# Patient Record
Sex: FEMALE | Race: WHITE | NOT HISPANIC OR LATINO | Employment: FULL TIME | URBAN - METROPOLITAN AREA
[De-identification: names, ages, dates, MRNs, and addresses within clinical notes are randomized per-mention and may not be internally consistent; named-entity substitution may affect disease eponyms.]

---

## 2024-11-15 ENCOUNTER — OFFICE VISIT (OUTPATIENT)
Dept: URGENT CARE | Facility: CLINIC | Age: 49
End: 2024-11-15
Payer: COMMERCIAL

## 2024-11-15 VITALS
RESPIRATION RATE: 16 BRPM | HEIGHT: 61 IN | WEIGHT: 158 LBS | HEART RATE: 101 BPM | DIASTOLIC BLOOD PRESSURE: 86 MMHG | TEMPERATURE: 98 F | SYSTOLIC BLOOD PRESSURE: 121 MMHG | BODY MASS INDEX: 29.83 KG/M2 | OXYGEN SATURATION: 98 %

## 2024-11-15 DIAGNOSIS — H66.91 ACUTE OTITIS MEDIA, RIGHT: ICD-10-CM

## 2024-11-15 DIAGNOSIS — R05.9 COUGH, UNSPECIFIED TYPE: ICD-10-CM

## 2024-11-15 DIAGNOSIS — J01.41 ACUTE RECURRENT PANSINUSITIS: ICD-10-CM

## 2024-11-15 DIAGNOSIS — J02.9 SORE THROAT: Primary | ICD-10-CM

## 2024-11-15 LAB
CTP QC/QA: YES
FLUAV AG NPH QL: NEGATIVE
FLUBV AG NPH QL: NEGATIVE
S PYO RRNA THROAT QL PROBE: NEGATIVE
SARS-COV-2 AG RESP QL IA.RAPID: NEGATIVE

## 2024-11-15 RX ORDER — LEVOCETIRIZINE DIHYDROCHLORIDE 5 MG/1
5 TABLET, FILM COATED ORAL NIGHTLY
Qty: 30 TABLET | Refills: 0 | Status: SHIPPED | OUTPATIENT
Start: 2024-11-15

## 2024-11-15 RX ORDER — PREDNISONE 20 MG/1
40 TABLET ORAL DAILY
Qty: 10 TABLET | Refills: 0 | Status: SHIPPED | OUTPATIENT
Start: 2024-11-15 | End: 2024-11-20

## 2024-11-15 RX ORDER — ALBUTEROL SULFATE 90 UG/1
1-2 INHALANT RESPIRATORY (INHALATION) EVERY 6 HOURS PRN
Qty: 18 G | Refills: 0 | Status: SHIPPED | OUTPATIENT
Start: 2024-11-15

## 2024-11-15 RX ORDER — AZELASTINE HYDROCHLORIDE, FLUTICASONE PROPIONATE 137; 50 UG/1; UG/1
1 SPRAY, METERED NASAL 2 TIMES DAILY
Qty: 23 G | Refills: 0 | Status: SHIPPED | OUTPATIENT
Start: 2024-11-15

## 2024-11-15 RX ORDER — AMOXICILLIN AND CLAVULANATE POTASSIUM 875; 125 MG/1; MG/1
1 TABLET, FILM COATED ORAL EVERY 12 HOURS
Qty: 20 TABLET | Refills: 0 | Status: SHIPPED | OUTPATIENT
Start: 2024-11-15 | End: 2024-11-25

## 2024-11-15 RX ORDER — PROMETHAZINE HYDROCHLORIDE AND DEXTROMETHORPHAN HYDROBROMIDE 6.25; 15 MG/5ML; MG/5ML
5 SYRUP ORAL
Qty: 118 ML | Refills: 0 | Status: SHIPPED | OUTPATIENT
Start: 2024-11-15 | End: 2024-11-25

## 2024-11-15 NOTE — PROGRESS NOTES
"Subjective:      Patient ID: Yolanda Anthony is a 49 y.o. female.    Vitals:  height is 5' 1" (1.549 m) and weight is 71.7 kg (158 lb). Her temperature is 98.1 °F (36.7 °C). Her blood pressure is 121/86 and her pulse is 101. Her respiration is 16 and oxygen saturation is 98%.     Chief Complaint: Cough, Nasal Congestion, and Otalgia    Patient is a 49-year-old female with a past medical history of GERD who presents to clinic for evaluation of sinus and URI related symptoms.  Patient reports symptoms began yesterday.  Patient reports she was COVID vaccinated however not boosted.  Patient reports not vaccinated for influenza.  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  Patient reports that she has not taken any over-the-counter medications for symptoms at this point.    Cough  This is a new problem. The current episode started yesterday. Associated symptoms include chills, ear pain (Right ear), headaches, myalgias, nasal congestion, postnasal drip and a sore throat. Pertinent negatives include no chest pain, fever, rash, shortness of breath or wheezing.       Constitution: Positive for chills and fatigue. Negative for sweating and fever.   HENT:  Positive for ear pain (Right ear), congestion, postnasal drip, sinus pressure, sore throat and voice change. Negative for ear discharge, tinnitus and hearing loss.    Neck: neck negative.   Cardiovascular: Negative.  Negative for chest pain and palpitations.   Eyes: Negative.    Respiratory:  Positive for cough and sputum production. Negative for chest tightness, shortness of breath and wheezing.    Gastrointestinal:  Positive for vomiting (Episode last night). Negative for abdominal pain, nausea and diarrhea.   Endocrine: negative.   Genitourinary: Negative.  Negative for dysuria, frequency and urgency.   Musculoskeletal:  Positive for back pain and muscle ache.   Skin: Negative.  Negative for color change, pale, rash and erythema. "   Allergic/Immunologic: Negative.    Neurological:  Positive for headaches. Negative for dizziness, light-headedness, passing out, disorientation and altered mental status.   Hematologic/Lymphatic: Negative.    Psychiatric/Behavioral: Negative.  Negative for altered mental status, disorientation and confusion.       Objective:     Physical Exam   Constitutional: She is oriented to person, place, and time. She appears well-developed. She is cooperative.  Non-toxic appearance. She appears ill. No distress.      Comments:Speaks in a hoarse voice     HENT:   Head: Normocephalic and atraumatic.   Ears:   Right Ear: Hearing, external ear and ear canal normal. No no drainage, swelling or tenderness. Tympanic membrane is erythematous and bulging. Tympanic membrane is not perforated. No decreased hearing is noted. no impacted cerumen  Left Ear: Hearing, tympanic membrane, external ear and ear canal normal.   Nose: Congestion present. No mucosal edema, rhinorrhea or nasal deformity. No epistaxis. Right sinus exhibits maxillary sinus tenderness and frontal sinus tenderness. Left sinus exhibits maxillary sinus tenderness and frontal sinus tenderness.   Mouth/Throat: Uvula is midline and mucous membranes are normal. Mucous membranes are moist. No trismus in the jaw. Normal dentition. No uvula swelling. Posterior oropharyngeal erythema present. No oropharyngeal exudate. Oropharynx is clear.   Eyes: Conjunctivae and lids are normal. Pupils are equal, round, and reactive to light. Right eye exhibits no discharge. Left eye exhibits no discharge. No scleral icterus.   Neck: Trachea normal and phonation normal. Neck supple.   Cardiovascular: Normal rate, regular rhythm, normal heart sounds and normal pulses.   Pulmonary/Chest: Effort normal and breath sounds normal. No respiratory distress (Unlabored.  Equal rise and fall of chest.  Able speak in full complete sentences.  No adventitious breath sounds noted.). She has no wheezes. She  has no rhonchi. She has no rales.   Abdominal: Normal appearance and bowel sounds are normal. She exhibits no distension. Soft. There is no abdominal tenderness.   Musculoskeletal: Normal range of motion.         General: Normal range of motion.   Neurological: She is alert and oriented to person, place, and time. She exhibits normal muscle tone.   Skin: Skin is warm, dry, intact, not diaphoretic, not pale and no rash. Capillary refill takes less than 2 seconds. No erythema   Psychiatric: Her speech is normal and behavior is normal. Judgment and thought content normal.   Nursing note and vitals reviewed.      Assessment:     1. Sore throat    2. Cough, unspecified type    3. Acute otitis media, right    4. Acute recurrent pansinusitis        Plan:       Sore throat  -     SARS Coronavirus 2 Antigen, POCT Manual Read  -     POCT Influenza A/B Rapid Antigen  -     POCT rapid strep A    Cough, unspecified type  -     SARS Coronavirus 2 Antigen, POCT Manual Read  -     POCT Influenza A/B Rapid Antigen  -     XR CHEST PA AND LATERAL; Future; Expected date: 11/15/2024    Acute otitis media, right    Acute recurrent pansinusitis    Other orders  -     amoxicillin-clavulanate 875-125mg (AUGMENTIN) 875-125 mg per tablet; Take 1 tablet by mouth every 12 (twelve) hours. for 10 days  Dispense: 20 tablet; Refill: 0  -     predniSONE (DELTASONE) 20 MG tablet; Take 2 tablets (40 mg total) by mouth once daily. for 5 days  Dispense: 10 tablet; Refill: 0  -     promethazine-dextromethorphan (PROMETHAZINE-DM) 6.25-15 mg/5 mL Syrp; Take 5 mLs by mouth every 4 to 6 hours as needed (Cough).  Dispense: 118 mL; Refill: 0  -     albuterol (VENTOLIN HFA) 90 mcg/actuation inhaler; Inhale 1-2 puffs into the lungs every 6 (six) hours as needed for Shortness of Breath or Wheezing. Rescue  Dispense: 18 g; Refill: 0  -     azelastine-fluticasone (DYMISTA) 137-50 mcg/spray Spry nassal spray; 1 spray by Each Nostril route 2 (two) times daily.   Dispense: 23 g; Refill: 0  -     levocetirizine (XYZAL) 5 MG tablet; Take 1 tablet (5 mg total) by mouth every evening.  Dispense: 30 tablet; Refill: 0                Labs: Influenza a and B negative.  COVID negative.  Rapid strep negative.    Chest x-ray completed in clinic.    Take medications as prescribed.    Discussed over-the-counter Mucinex with patient.    Tylenol/Motrin per package instructions for any pain or fever.    Assure adequate hydration.    Nasal saline flushes or irrigation as directed.    Follow-up with PCP in 1-2 days.    Return to clinic as needed.    To ED for any new or acutely worsening symptoms.    Patient in agreement with plan of care.    DISCLAIMER: Please note that my documentation in this Electronic Healthcare Record was produced using speech recognition software and therefore may contain errors related to that software system.These could include grammar, punctuation and spelling errors or the inclusion/exclusion of phrases that were not intended. Garbled syntax, mangled pronouns, and other bizarre constructions may be attributed to that software system.

## 2025-04-09 ENCOUNTER — OFFICE VISIT (OUTPATIENT)
Dept: URGENT CARE | Facility: CLINIC | Age: 50
End: 2025-04-09
Payer: COMMERCIAL

## 2025-04-09 ENCOUNTER — RESULTS FOLLOW-UP (OUTPATIENT)
Dept: URGENT CARE | Facility: CLINIC | Age: 50
End: 2025-04-09

## 2025-04-09 ENCOUNTER — HOSPITAL ENCOUNTER (OUTPATIENT)
Dept: RADIOLOGY | Facility: HOSPITAL | Age: 50
Discharge: HOME | End: 2025-04-09
Attending: STUDENT IN AN ORGANIZED HEALTH CARE EDUCATION/TRAINING PROGRAM
Payer: COMMERCIAL

## 2025-04-09 ENCOUNTER — TELEPHONE (OUTPATIENT)
Dept: URGENT CARE | Facility: CLINIC | Age: 50
End: 2025-04-09

## 2025-04-09 VITALS
HEIGHT: 61 IN | DIASTOLIC BLOOD PRESSURE: 89 MMHG | SYSTOLIC BLOOD PRESSURE: 127 MMHG | RESPIRATION RATE: 20 BRPM | HEART RATE: 78 BPM | BODY MASS INDEX: 29.83 KG/M2 | WEIGHT: 158 LBS | OXYGEN SATURATION: 99 % | TEMPERATURE: 98 F

## 2025-04-09 DIAGNOSIS — S09.90XA HEAD TRAUMA, INITIAL ENCOUNTER: ICD-10-CM

## 2025-04-09 DIAGNOSIS — M54.2 NECK PAIN: ICD-10-CM

## 2025-04-09 DIAGNOSIS — R42 DIZZINESS: ICD-10-CM

## 2025-04-09 DIAGNOSIS — S14.109A INJURY OF CERVICAL SPINE, INITIAL ENCOUNTER: ICD-10-CM
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
## 2025-04-09 DIAGNOSIS — S16.1XXA STRAIN OF NECK MUSCLE, INITIAL ENCOUNTER: ICD-10-CM

## 2025-04-09 DIAGNOSIS — S06.0XAA CONCUSSION WITH UNKNOWN LOSS OF CONSCIOUSNESS STATUS, INITIAL ENCOUNTER: ICD-10-CM

## 2025-04-09 PROCEDURE — 70450 CT HEAD/BRAIN W/O DYE: CPT | Mod: 26,,, | Performed by: RADIOLOGY

## 2025-04-09 PROCEDURE — 72125 CT NECK SPINE W/O DYE: CPT | Mod: TC

## 2025-04-09 PROCEDURE — 72125 CT NECK SPINE W/O DYE: CPT | Mod: 26,,, | Performed by: RADIOLOGY

## 2025-04-09 PROCEDURE — 70450 CT HEAD/BRAIN W/O DYE: CPT | Mod: TC

## 2025-04-09 RX ORDER — NAPROXEN 500 MG/1
500 TABLET ORAL 2 TIMES DAILY WITH MEALS
Qty: 20 TABLET | Refills: 0 | Status: SHIPPED | OUTPATIENT
Start: 2025-04-09 | End: 2025-04-19

## 2025-04-09 RX ORDER — METHOCARBAMOL 750 MG/1
750 TABLET, FILM COATED ORAL 3 TIMES DAILY PRN
Qty: 15 TABLET | Refills: 0 | Status: SHIPPED | OUTPATIENT
Start: 2025-04-09 | End: 2025-04-19

## 2025-04-09 NOTE — LETTER
April 9, 2025      [facility] [place]  [address]  [address]  [place] [address]  Phone: [phone]  Fax: [phone]       Patient: Yolanda Anthony   YOB: 1975  Date of Visit: 04/09/2025    To Whom It May Concern:    Ivan Anthony  was at [facility] on 04/09/2025. The patient may return to work/school on 04/10/2025 with no restrictions. If you have any questions or concerns, or if I can be of further assistance, please do not hesitate to contact me.    Sincerely,    Beny Vivas NP
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
## 2025-04-09 NOTE — TELEPHONE ENCOUNTER
Patient contacted and advised of negative CT head and CT C-spine results.  Patient has no further questions or concerns at this point.  Patient advised to follow-up closely with PCP or return to clinic/emergency department for any continued symptoms.  Patient in agreement with plan of care.

## 2025-04-10 ENCOUNTER — OFFICE VISIT (OUTPATIENT)
Dept: PODIATRY | Facility: CLINIC | Age: 50
End: 2025-04-10
Payer: COMMERCIAL

## 2025-04-10 ENCOUNTER — HOSPITAL ENCOUNTER (OUTPATIENT)
Dept: RADIOLOGY | Facility: HOSPITAL | Age: 50
Discharge: HOME | End: 2025-04-10
Attending: PODIATRIST
Payer: COMMERCIAL

## 2025-04-10 VITALS
DIASTOLIC BLOOD PRESSURE: 76 MMHG | WEIGHT: 158.06 LBS | SYSTOLIC BLOOD PRESSURE: 110 MMHG | BODY MASS INDEX: 29.84 KG/M2 | HEIGHT: 61 IN | HEART RATE: 74 BPM

## 2025-04-10 DIAGNOSIS — M20.11 HALLUX VALGUS OF RIGHT FOOT: ICD-10-CM

## 2025-04-10 DIAGNOSIS — D36.10 NEUROMA: Primary | ICD-10-CM

## 2025-04-10 PROCEDURE — 73630 X-RAY EXAM OF FOOT: CPT | Mod: 26,RT,, | Performed by: RADIOLOGY

## 2025-04-10 PROCEDURE — 1160F RVW MEDS BY RX/DR IN RCRD: CPT | Mod: S$GLB,,, | Performed by: PODIATRIST

## 2025-04-10 PROCEDURE — 3078F DIAST BP <80 MM HG: CPT | Mod: S$GLB,,, | Performed by: PODIATRIST

## 2025-04-10 PROCEDURE — 3074F SYST BP LT 130 MM HG: CPT | Mod: S$GLB,,, | Performed by: PODIATRIST

## 2025-04-10 PROCEDURE — 3008F BODY MASS INDEX DOCD: CPT | Mod: S$GLB,,, | Performed by: PODIATRIST

## 2025-04-10 PROCEDURE — 99203 OFFICE O/P NEW LOW 30 MIN: CPT | Mod: S$GLB,,, | Performed by: PODIATRIST

## 2025-04-10 PROCEDURE — 99999 PR PBB SHADOW E&M-EST. PATIENT-LVL V: CPT | Mod: PBBFAC,,, | Performed by: PODIATRIST

## 2025-04-10 PROCEDURE — 73630 X-RAY EXAM OF FOOT: CPT | Mod: TC,PN,RT

## 2025-04-10 PROCEDURE — 1159F MED LIST DOCD IN RCRD: CPT | Mod: S$GLB,,, | Performed by: PODIATRIST

## 2025-04-10 RX ORDER — BUSPIRONE HYDROCHLORIDE 15 MG/1
15 TABLET ORAL 3 TIMES DAILY
COMMUNITY
Start: 2024-11-17

## 2025-04-10 RX ORDER — CETIRIZINE HYDROCHLORIDE 10 MG/1
10 TABLET ORAL
COMMUNITY
Start: 2025-02-27

## 2025-04-10 RX ORDER — VENLAFAXINE HYDROCHLORIDE 150 MG/1
1 TABLET, EXTENDED RELEASE ORAL EVERY MORNING
COMMUNITY
Start: 2025-03-16

## 2025-04-13 NOTE — PROGRESS NOTES
"Subjective:       Patient ID: Yolanda Anthony is a 50 y.o. female.    Chief Complaint: Toe Pain  Patient presents with a complaint of numbness in the toes on her right foot she also has a bunion which runs in her family.  Patient states the left foot is not really bothering her.  Patient relates a zinging type pain that she has been experiencing ever since she had knee surgery on the right side.  Patient states she was not sure if nerves were damaged at the time of the knee surgery.  Patient works as a  so she is on her feet quite a bit.    History reviewed. No pertinent past medical history.  Past Surgical History:   Procedure Laterality Date    KNEE SURGERY Bilateral      No family history on file.  Social History     Socioeconomic History    Marital status:    Tobacco Use    Smoking status: Never    Smokeless tobacco: Never   Substance and Sexual Activity    Alcohol use: Not Currently    Drug use: Never       Current Medications[1]  Review of patient's allergies indicates:   Allergen Reactions    Insect venom Anaphylaxis    Wasp venom Anaphylaxis    Venom-honey bee Nausea And Vomiting       Review of Systems   Musculoskeletal:  Positive for arthralgias and joint swelling.   Neurological:  Positive for numbness.   All other systems reviewed and are negative.      Objective:      Vitals:    04/10/25 0804   BP: 110/76   Pulse: 74   Weight: 71.7 kg (158 lb 1.1 oz)   Height: 5' 1" (1.549 m)     Physical Exam  Vitals and nursing note reviewed.   Constitutional:       Appearance: Normal appearance.   Cardiovascular:      Pulses:           Dorsalis pedis pulses are 2+ on the right side and 2+ on the left side.        Posterior tibial pulses are 1+ on the right side and 1+ on the left side.   Pulmonary:      Effort: Pulmonary effort is normal.   Musculoskeletal:         General: Swelling and tenderness present.      Right foot: Bunion and prominent metatarsal heads present.      Left foot: " Prominent metatarsal heads present.        Feet:    Feet:      Right foot:      Protective Sensation: 3 sites tested.  3 sites sensed.      Skin integrity: Erythema and warmth present.      Left foot:      Protective Sensation: 3 sites tested.  3 sites sensed.   Skin:     Capillary Refill: Capillary refill takes 2 to 3 seconds.      Findings: Erythema present.   Neurological:      General: No focal deficit present.      Mental Status: She is alert.   Psychiatric:         Mood and Affect: Mood normal.         Behavior: Behavior normal.                        Assessment:       1. Neuroma    2. Hallux valgus of right foot        Plan:       Patient presents with a complaint of numbness in the toes on her right foot she also has a bunion which runs in her family.  Patient states the left foot is not really bothering her.  Patient relates a zinging type pain that she has been experiencing ever since she had knee surgery on the right side.  Patient states she was not sure if nerves were damaged at the time of the knee surgery.  Patient works as a  so she is on her feet quite a bit.  A comprehensive new patient evaluation was performed today patient does have some mild bunion discomfort I have ordered plain film x-rays I want to evaluate the patient's right foot she does have some well-defined inflammation and swelling on the plantar forefoot right in comparison to the left I advised the patient she has symptoms consistent with neuroma this would be causing the numbness in the toes and the fact that all of her toes are not numb indicates this did not happen at the time of the knee surgery did not have anything to do with the knee surgery or a nerve being injured this is related to the bones being compressed in between the metatarsal heads likely because of the bunion deformity and the transfer of weight to the other metatarsals because of the hypermobility of the 1st metatarsal.  Patient does have significant  hypermobility at the level of the base of the 1st metatarsal medial cuneiform joint this is appreciated bilateral right greater than left.  I will review the plain film x-rays with the patient at her follow-up in the meantime I have dispensed the patient some small blue arch pads and I put these in her shoe she is going to put him in her other shoes I gave her extra as it is important that she has appropriate support this can be very beneficial at a dressing neuroma discomfort we may have to consider a metatarsal pad she definitely has got to wear shoes with a wide toe box.  Patient's numbness is primarily the 2nd 3rd and 4th digits she definitely has pain upon palpation and compression of the plantar forefoot right.  Follow-up 2 weeks we will re-evaluate the patient see how she is doing we can also review the x-rays at that time.This note was created using [employer] voice recognition software that occasionally misinterpreted phrases or words.        [1]   Current Outpatient Medications   Medication Sig Dispense Refill    albuterol (VENTOLIN HFA) 90 mcg/actuation inhaler Inhale 1-2 puffs into the lungs every 6 (six) hours as needed for Shortness of Breath or Wheezing. Rescue 18 g 0    azelastine-fluticasone (DYMISTA) 137-50 mcg/spray Spry nassal spray 1 spray by Each Nostril route 2 (two) times daily. 23 g 0    busPIRone (BUSPAR) 15 MG tablet Take 15 mg by mouth 3 (three) times daily.      cetirizine (ZYRTEC) 10 MG tablet Take 10 mg by mouth.      cetirizine-pseudoephedrine 5-120 mg Tb12 Take 1 tablet by mouth every 12 (twelve) hours as needed (nasal congestion). 18 tablet 0    EPINEPHrine 0.15 mg/0.15 mL AtIn Inject 0.15 mg into the muscle.      famotidine (PEPCID) 40 MG tablet Take 40 mg by mouth.      levocetirizine (XYZAL) 5 MG tablet Take 1 tablet (5 mg total) by mouth every evening. 30 tablet 0    methocarbamoL (ROBAXIN) 750 MG Tab Take 1 tablet (750 mg total) by mouth 3 (three) times daily as needed (Pain). 15  tablet 0    naproxen (NAPROSYN) 500 MG tablet Take 1 tablet (500 mg total) by mouth 2 (two) times daily with meals. for 10 days 20 tablet 0    omeprazole (PRILOSEC) 20 MG capsule Take 20 mg by mouth 3 (three) times daily.      pantoprazole (PROTONIX) 40 MG tablet Take 1 tablet (40 mg total) by mouth once daily. 90 tablet 3    venlafaxine (EFFEXOR) 75 MG tablet Take 75 mg by mouth.      venlafaxine 150 mg TR24 Take 1 tablet by mouth every morning.       No current facility-administered medications for this visit.

## 2025-04-24 ENCOUNTER — OFFICE VISIT (OUTPATIENT)
Dept: PODIATRY | Facility: CLINIC | Age: 50
End: 2025-04-24
Payer: COMMERCIAL

## 2025-04-24 VITALS
DIASTOLIC BLOOD PRESSURE: 77 MMHG | BODY MASS INDEX: 29.84 KG/M2 | HEIGHT: 61 IN | HEART RATE: 71 BPM | WEIGHT: 158.06 LBS | SYSTOLIC BLOOD PRESSURE: 112 MMHG

## 2025-04-24 DIAGNOSIS — M20.11 HALLUX VALGUS OF RIGHT FOOT: ICD-10-CM

## 2025-04-24 DIAGNOSIS — D36.10 NEUROMA: Primary | ICD-10-CM

## 2025-04-24 PROCEDURE — 3008F BODY MASS INDEX DOCD: CPT | Mod: S$GLB,,, | Performed by: PODIATRIST

## 2025-04-24 PROCEDURE — 1159F MED LIST DOCD IN RCRD: CPT | Mod: S$GLB,,, | Performed by: PODIATRIST

## 2025-04-24 PROCEDURE — 3074F SYST BP LT 130 MM HG: CPT | Mod: S$GLB,,, | Performed by: PODIATRIST

## 2025-04-24 PROCEDURE — 1160F RVW MEDS BY RX/DR IN RCRD: CPT | Mod: S$GLB,,, | Performed by: PODIATRIST

## 2025-04-24 PROCEDURE — 99213 OFFICE O/P EST LOW 20 MIN: CPT | Mod: S$GLB,,, | Performed by: PODIATRIST

## 2025-04-24 PROCEDURE — 99999 PR PBB SHADOW E&M-EST. PATIENT-LVL IV: CPT | Mod: PBBFAC,,, | Performed by: PODIATRIST

## 2025-04-24 PROCEDURE — 3078F DIAST BP <80 MM HG: CPT | Mod: S$GLB,,, | Performed by: PODIATRIST

## 2025-04-27 NOTE — PROGRESS NOTES
"Subjective:       Patient ID: Yolanda Anthony is a 50 y.o. female.    Chief Complaint: Follow-up  Patient presents for follow-up with a complaint of numbness in the toes on her right foot she also has a bunion which runs in her family.  Patient states the left foot is not really bothering her.  Patient relates a zinging type pain that she has been experiencing ever since she had knee surgery on the right side.  Patient works as a  so she is on her feet quite a bit.    History reviewed. No pertinent past medical history.  Past Surgical History:   Procedure Laterality Date    KNEE SURGERY Bilateral      No family history on file.  Social History     Socioeconomic History    Marital status:    Tobacco Use    Smoking status: Never    Smokeless tobacco: Never   Substance and Sexual Activity    Alcohol use: Not Currently    Drug use: Never       Current Medications[1]  Review of patient's allergies indicates:   Allergen Reactions    Insect venom Anaphylaxis    Wasp venom Anaphylaxis    Venom-honey bee Nausea And Vomiting       Review of Systems   Musculoskeletal:  Positive for arthralgias and joint swelling.   Neurological:  Positive for numbness.   All other systems reviewed and are negative.      Objective:      Vitals:    04/24/25 0807   BP: 112/77   Pulse: 71   Weight: 71.7 kg (158 lb 1.1 oz)   Height: 5' 1" (1.549 m)     Physical Exam  Vitals and nursing note reviewed.   Constitutional:       Appearance: Normal appearance.   Cardiovascular:      Pulses:           Dorsalis pedis pulses are 2+ on the right side and 2+ on the left side.        Posterior tibial pulses are 1+ on the right side and 1+ on the left side.   Pulmonary:      Effort: Pulmonary effort is normal.   Musculoskeletal:         General: Swelling and tenderness present.      Right foot: Bunion and prominent metatarsal heads present.      Left foot: Prominent metatarsal heads present.        Feet:    Feet:      Right foot:      " Protective Sensation: 3 sites tested.  3 sites sensed.      Skin integrity: Erythema and warmth present.      Left foot:      Protective Sensation: 3 sites tested.  3 sites sensed.   Skin:     Capillary Refill: Capillary refill takes 2 to 3 seconds.      Findings: Erythema present.   Neurological:      General: No focal deficit present.      Mental Status: She is alert.   Psychiatric:         Mood and Affect: Mood normal.         Behavior: Behavior normal.                              Assessment:       1. Neuroma    2. Hallux valgus of right foot        Plan:       Patient presents today for follow-up neuroma right foot.  Patient states she is doing a lot better she is not really having any numbness or discomfort as long as she is wearing her tennis shoes with the additional arch padding.  I did advised the patient it is going to be very important that she continue to use the appropriate arch support at all times clearly she needed the extra arch support to take pressure off of the forefoot placing they pressure in the arch where it is supposed to be.  I did advised the patient I would recommend we continue with the blue arch pads size small that I had previously dispensed her she can put these in all of her shoes use these at all times she does wear tennis shoes to work which has very good.  I explained to the patient over time if she needs to wear a different shoe for short period of time it should not be a problem however as long as she is consistently using the blue arch pads she should remain pain-free however if for any reason the neuroma pain returns she starts having more discomfort more numbness tingling or singing type pain in the digits on the right foot she should contact us immediately we will likely have to look at something more aggressive regarding arch support possibly even power steps but right now we will stick with the blue arch pads which I dispensed her additional of.  Follow-up as needed.   This note was created using [employer] voice recognition software that occasionally misinterpreted phrases or words.            [1]   Current Outpatient Medications   Medication Sig Dispense Refill    albuterol (VENTOLIN HFA) 90 mcg/actuation inhaler Inhale 1-2 puffs into the lungs every 6 (six) hours as needed for Shortness of Breath or Wheezing. Rescue 18 g 0    azelastine-fluticasone (DYMISTA) 137-50 mcg/spray Spry nassal spray 1 spray by Each Nostril route 2 (two) times daily. 23 g 0    busPIRone (BUSPAR) 15 MG tablet Take 15 mg by mouth 3 (three) times daily.      cetirizine (ZYRTEC) 10 MG tablet Take 10 mg by mouth.      cetirizine-pseudoephedrine 5-120 mg Tb12 Take 1 tablet by mouth every 12 (twelve) hours as needed (nasal congestion). 18 tablet 0    EPINEPHrine 0.15 mg/0.15 mL AtIn Inject 0.15 mg into the muscle.      famotidine (PEPCID) 40 MG tablet Take 40 mg by mouth.      levocetirizine (XYZAL) 5 MG tablet Take 1 tablet (5 mg total) by mouth every evening. 30 tablet 0    omeprazole (PRILOSEC) 20 MG capsule Take 20 mg by mouth 3 (three) times daily.      pantoprazole (PROTONIX) 40 MG tablet Take 1 tablet (40 mg total) by mouth once daily. 90 tablet 3    venlafaxine (EFFEXOR) 75 MG tablet Take 75 mg by mouth.      venlafaxine 150 mg TR24 Take 1 tablet by mouth every morning.       No current facility-administered medications for this visit.

## 2025-07-01 ENCOUNTER — OFFICE VISIT (OUTPATIENT)
Dept: CARDIOLOGY | Facility: CLINIC | Age: 50
End: 2025-07-01
Payer: COMMERCIAL

## 2025-07-01 VITALS
WEIGHT: 161.38 LBS | DIASTOLIC BLOOD PRESSURE: 68 MMHG | HEIGHT: 61 IN | SYSTOLIC BLOOD PRESSURE: 130 MMHG | OXYGEN SATURATION: 98 % | BODY MASS INDEX: 30.47 KG/M2 | HEART RATE: 79 BPM

## 2025-07-01 DIAGNOSIS — Z13.6 ENCOUNTER FOR SCREENING FOR CARDIOVASCULAR DISORDERS: ICD-10-CM

## 2025-07-01 DIAGNOSIS — R07.9 CHEST PAIN, UNSPECIFIED TYPE: Primary | ICD-10-CM

## 2025-07-01 DIAGNOSIS — Z82.49 FAMILY HISTORY OF HEART DISEASE: ICD-10-CM

## 2025-07-01 PROCEDURE — 3008F BODY MASS INDEX DOCD: CPT | Mod: CPTII,S$GLB,, | Performed by: INTERNAL MEDICINE

## 2025-07-01 PROCEDURE — 1159F MED LIST DOCD IN RCRD: CPT | Mod: CPTII,S$GLB,, | Performed by: INTERNAL MEDICINE

## 2025-07-01 PROCEDURE — 99204 OFFICE O/P NEW MOD 45 MIN: CPT | Mod: S$GLB,,, | Performed by: INTERNAL MEDICINE

## 2025-07-01 PROCEDURE — 99999 PR PBB SHADOW E&M-EST. PATIENT-LVL IV: CPT | Mod: PBBFAC,,, | Performed by: INTERNAL MEDICINE

## 2025-07-01 PROCEDURE — 3078F DIAST BP <80 MM HG: CPT | Mod: CPTII,S$GLB,, | Performed by: INTERNAL MEDICINE

## 2025-07-01 PROCEDURE — 3075F SYST BP GE 130 - 139MM HG: CPT | Mod: CPTII,S$GLB,, | Performed by: INTERNAL MEDICINE

## 2025-07-01 RX ORDER — METOPROLOL SUCCINATE 25 MG/1
25 TABLET, EXTENDED RELEASE ORAL DAILY
Qty: 90 TABLET | Refills: 3 | Status: SHIPPED | OUTPATIENT
Start: 2025-07-01 | End: 2026-07-01

## 2025-07-01 NOTE — PROGRESS NOTES
[facility]    Subjective:     Patient ID:  Yolanda Anthony is a 50 y.o. female patient here for evaluation Chest Pain (Chest discomfort )      History of Present Illness    CHIEF COMPLAINT:  Patient presents today with concerns about chest discomfort.    HISTORY OF PRESENT ILLNESS:  She experiences intermittent chest discomfort described as a pulling sensation, similar to a rubber band. Episodes occur randomly, including at rest and during stressful situations at school, typically lasting a few minutes. The discomfort does not radiate to her arm and resolves spontaneously with deep breathing. She reports fewer episodes during summer when not in school. She denies chest discomfort with prolonged physical activity and maintains normal activity levels. In , she experienced 1 significant episode where she felt like she was having a cardiac event, which she managed using calming techniques learned from her father, a former EMT.    CARDIOVASCULAR:  She has a history of high cholesterol with previous levels between 215-230 mg/dL.    GASTROINTESTINAL:  She has a history of esophageal issues requiring stretching due to smaller than typical size. She reports frequent choking due to this condition.    PSYCHIATRIC:  She has a history of panic attacks during her first marriage while living in [place], previously treated with Xanax. She currently takes anxiety medication.    FAMILY HISTORY:  Father had cardiac event at age 72 due to medication interaction. Grandfather  of cardiac event in his 60s with symptoms of indigestion and chest pain. Younger sister has high cholesterol without associated cardiac problems.    SOCIAL HISTORY:  She consumes 1 [place] alcoholic beverage after school and sugar-free energy drinks. She is a non-smoker but has second-hand exposure to vaping from her , who has been asked to avoid vaping in enclosed  "spaces.      ROS:  ROS is negative unless otherwise indicated in HPI.           Review of Systems   All other systems reviewed and are negative.       No past medical history on file.    Past Surgical History:   Procedure Laterality Date    KNEE SURGERY Bilateral        No family history on file.    Social History     Socioeconomic History    Marital status:    Tobacco Use    Smoking status: Never    Smokeless tobacco: Never   Substance and Sexual Activity    Alcohol use: Not Currently    Drug use: Never       Current Medications[1]    Review of patient's allergies indicates:   Allergen Reactions    Insect venom Anaphylaxis    Wasp venom Anaphylaxis    Venom-honey bee Nausea And Vomiting         Objective:        Vitals:    07/01/25 0949   BP: 130/68   Pulse: 79       Physical Exam  Vitals reviewed.   Constitutional:       Appearance: Normal appearance.   Cardiovascular:      Rate and Rhythm: Normal rate and regular rhythm.      Pulses: Normal pulses.      Heart sounds: Normal heart sounds. No murmur heard.     No gallop.   Pulmonary:      Effort: Pulmonary effort is normal.   Skin:     General: Skin is warm.   Neurological:      General: No focal deficit present.      Mental Status: She is alert and oriented to person, place, and time.         LIPIDS - LAST 2   No results found for: "CHOL", "HDL", "LDLCALC", "TRIG", "CHOLHDL"    CBC - LAST 2  No results found for: "WBC", "RBC", "HGB", "HCT", "MCV", "MCH", "MCHC", "RDW", "PLT", "MPV", "GRAN", "LYMPH", "MONO", "BASO", "NRBC"    CHEMISTRY & LIVER FUNCTION - LAST 2  No results found for: "NA", "K", "CL", "CO2", "ANIONGAP", "BUN", "CREATININE", "GLU", "CALCIUM", "PH", "MG", "ALBUMIN", "PROT", "ALKPHOS", "ALT", "AST", "BILITOT"     CARDIAC PROFILE - LAST 2  No results found for: "BNP", "CPK", "CPKMB", "LDH", "TROPONINI"     COAGULATION - LAST 2  No results found for: "LABPT", "INR", "APTT"    ENDOCRINE & PSA - LAST 2  No results found for: "HGBA1C", "MICROALBUR", " ""TSH", "PROCAL", "PSA"     ECHOCARDIOGRAM RESULTS  No results found for this or any previous visit.      CURRENT/PREVIOUS VISIT EKG  No results found for this or any previous visit.  No valid procedures specified.   No results found for this or any previous visit.    No valid procedures specified.        Assessment:        Assessment & Plan      50-year-old female for cardiovascular evaluation due to atypical chest discomfort and family history of heart disease.  Will evaluate further with stress test, calcium scoring.     PLAN SUMMARY:  - Ordered non-radiation stress test and CT calcium scoring  - Initiated beta blockers (metoprolol) daily  - Stay hydrated, moderate caffeine, avoid stimulants and secondhand vape smoke  - Reduce alcohol consumption  - Contact office for worsening symptoms or concerns  - Follow up in 4 weeks    CARDIOVASCULAR HEALTH:  - Ordered non-radiation stress test and CT calcium scoring to evaluate for potential heart issues.  - Initiated beta blockers (metoprolol) daily, including today, to address heart rate and blood pressure, potentially alleviating symptoms.    ANXIETY:  - Explained the potential link between anxiety and chest discomfort.    LIFESTYLE MODIFICATIONS:  - Stay well hydrated at all times, moderate caffeine intake, avoid energy drinks and other stimulants, reduce alcohol consumption, avoid exposure to secondhand vape smoke.    FOLLOW-UP:  - Follow up in about 4 weeks.  - Contact office for worsening symptoms or concerns.        1. Chest pain, unspecified type    2. Family history of heart disease    3. Encounter for screening for cardiovascular disorders           Plan:       Chest pain, unspecified type  -     Ambulatory consult to Cardiology  -     IN OFFICE EKG 12-LEAD (to [facility])  -     Exercise Stress - EKG; Future  -     metoprolol succinate (TOPROL-XL) 25 MG 24 hr tablet; Take 1 tablet (25 mg total) by mouth once daily.  Dispense: 90 tablet; Refill: 3    Family history of " heart disease  -     Ambulatory consult to Cardiology  -     IN OFFICE EKG 12-LEAD (to [facility])  -     Exercise Stress - EKG; Future  -     metoprolol succinate (TOPROL-XL) 25 MG 24 hr tablet; Take 1 tablet (25 mg total) by mouth once daily.  Dispense: 90 tablet; Refill: 3    Encounter for screening for cardiovascular disorders  -     CT Cardiac Scoring; Future; Expected date: 07/01/2025      Follow up in about 4 weeks (around 7/29/2025) for f/u chest pain.        All pertinent data including labs, imaging, EKGs, and studies listed above were reviewed personally.  Patient's most recent EKG tracing was personally interpreted by this provider.    This note was generated with the assistance of ambient listening technology. Verbal consent was obtained by the patient and accompanying visitor(s) for the recording of patient appointment to facilitate this note. I attest to having reviewed and edited the generated note for accuracy, though some syntax or spelling errors may persist. Please contact the author of this note for any clarification.      Isael Hernandez MD  [facility] [place]         [1]   Current Outpatient Medications   Medication Sig Dispense Refill    albuterol (VENTOLIN HFA) 90 mcg/actuation inhaler Inhale 1-2 puffs into the lungs every 6 (six) hours as needed for Shortness of Breath or Wheezing. Rescue 18 g 0    azelastine-fluticasone (DYMISTA) 137-50 mcg/spray Spry nassal spray 1 spray by Each Nostril route 2 (two) times daily. 23 g 0    busPIRone (BUSPAR) 15 MG tablet Take 15 mg by mouth 3 (three) times daily.      cetirizine (ZYRTEC) 10 MG tablet Take 10 mg by mouth.      pantoprazole (PROTONIX) 40 MG tablet Take 1 tablet (40 mg total) by mouth once daily. 90 tablet 3    RED YEAST RICE ORAL Take by mouth Daily.      venlafaxine 150 mg TR24 Take 1 tablet by mouth every morning.      cetirizine-pseudoephedrine 5-120 mg Tb12 Take 1 tablet by mouth every 12  (twelve) hours as needed (nasal congestion). (Patient not taking: Reported on 7/1/2025) 18 tablet 0    EPINEPHrine 0.15 mg/0.15 mL AtIn Inject 0.15 mg into the muscle. (Patient not taking: Reported on 7/1/2025)      famotidine (PEPCID) 40 MG tablet Take 40 mg by mouth. (Patient not taking: Reported on 7/1/2025)      levocetirizine (XYZAL) 5 MG tablet Take 1 tablet (5 mg total) by mouth every evening. (Patient not taking: Reported on 7/1/2025) 30 tablet 0    metoprolol succinate (TOPROL-XL) 25 MG 24 hr tablet Take 1 tablet (25 mg total) by mouth once daily. 90 tablet 3    omeprazole (PRILOSEC) 20 MG capsule Take 20 mg by mouth 3 (three) times daily. (Patient not taking: Reported on 7/1/2025)      venlafaxine (EFFEXOR) 75 MG tablet Take 75 mg by mouth. (Patient not taking: Reported on 7/1/2025)       No current facility-administered medications for this visit.

## 2025-07-02 LAB
OHS QRS DURATION: 88 MS
OHS QTC CALCULATION: 429 MS

## 2025-07-03 ENCOUNTER — TELEPHONE (OUTPATIENT)
Dept: CARDIOLOGY | Facility: HOSPITAL | Age: 50
End: 2025-07-03

## 2025-07-03 NOTE — TELEPHONE ENCOUNTER
Regular stress that you talk to:  Patient advised, test will be at [facility] ([address]  Will need to register on the first floor at the main entrance.  Patient advised that arrival time is 0840.  Patient advised, may take their medications prior to testing if you need to.  Patient should HOLD METOPROLOL. Advised if medications are taken with food, please keep it light, like toast and juice.    Patient advised to avoid all caffeine 12 hours prior to testing.  This includes decaf tea and coffee.    Wear comfortable clothing with tennis shoes.  No lotions, oils, or powders to the upper chest area. May wear deodorant.    Patient advised that an IV will be placed for safety.  Patient verbalizes understanding of instructions.

## 2025-07-07 ENCOUNTER — HOSPITAL ENCOUNTER (OUTPATIENT)
Dept: CARDIOLOGY | Facility: HOSPITAL | Age: 50
Discharge: HOME | End: 2025-07-07
Attending: INTERNAL MEDICINE
Payer: COMMERCIAL

## 2025-07-07 DIAGNOSIS — R07.9 CHEST PAIN, UNSPECIFIED TYPE: ICD-10-CM

## 2025-07-07 DIAGNOSIS — Z82.49 FAMILY HISTORY OF HEART DISEASE: ICD-10-CM

## 2025-07-07 LAB
CV STRESS BASE HR: 84 BPM
DIASTOLIC BLOOD PRESSURE: 84 MMHG
OHS CV CPX 1 MINUTE RECOVERY HEART RATE: 125 BPM
OHS CV CPX 85 PERCENT MAX PREDICTED HEART RATE MALE: 145
OHS CV CPX ESTIMATED METS: 10
OHS CV CPX MAX PREDICTED HEART RATE: 170
OHS CV CPX PATIENT IS FEMALE: 1
OHS CV CPX PATIENT IS MALE: 0
OHS CV CPX PEAK DIASTOLIC BLOOD PRESSURE: 82 MMHG
OHS CV CPX PEAK HEAR RATE: 145 BPM
OHS CV CPX PEAK RATE PRESSURE PRODUCT: NORMAL
OHS CV CPX PEAK SYSTOLIC BLOOD PRESSURE: 184 MMHG
OHS CV CPX PERCENT MAX PREDICTED HEART RATE ACHIEVED: 90
OHS CV CPX RATE PRESSURE PRODUCT PRESENTING: NORMAL
STRESS ECHO POST EXERCISE DUR MIN: 8 MINUTES
STRESS ECHO POST EXERCISE DUR SEC: 42 SECONDS
SYSTOLIC BLOOD PRESSURE: 148 MMHG

## 2025-07-07 PROCEDURE — 93018 CV STRESS TEST I&R ONLY: CPT | Mod: ,,, | Performed by: INTERNAL MEDICINE

## 2025-07-07 PROCEDURE — 93016 CV STRESS TEST SUPVJ ONLY: CPT | Mod: ,,, | Performed by: INTERNAL MEDICINE

## 2025-07-07 PROCEDURE — 93017 CV STRESS TEST TRACING ONLY: CPT

## 2025-07-23 ENCOUNTER — OFFICE VISIT (OUTPATIENT)
Dept: CARDIOLOGY | Facility: CLINIC | Age: 50
End: 2025-07-23
Payer: COMMERCIAL

## 2025-07-23 VITALS
SYSTOLIC BLOOD PRESSURE: 118 MMHG | DIASTOLIC BLOOD PRESSURE: 70 MMHG | HEIGHT: 61 IN | HEART RATE: 71 BPM | WEIGHT: 167.31 LBS | BODY MASS INDEX: 31.59 KG/M2 | OXYGEN SATURATION: 98 %

## 2025-07-23 DIAGNOSIS — R07.9 CHEST PAIN, UNSPECIFIED TYPE: Primary | ICD-10-CM

## 2025-07-23 PROCEDURE — 99999 PR PBB SHADOW E&M-EST. PATIENT-LVL III: CPT | Mod: PBBFAC,,, | Performed by: INTERNAL MEDICINE

## 2025-07-23 PROCEDURE — 1159F MED LIST DOCD IN RCRD: CPT | Mod: CPTII,S$GLB,, | Performed by: INTERNAL MEDICINE

## 2025-07-23 PROCEDURE — 99212 OFFICE O/P EST SF 10 MIN: CPT | Mod: S$GLB,,, | Performed by: INTERNAL MEDICINE

## 2025-07-23 PROCEDURE — 3078F DIAST BP <80 MM HG: CPT | Mod: CPTII,S$GLB,, | Performed by: INTERNAL MEDICINE

## 2025-07-23 PROCEDURE — 3074F SYST BP LT 130 MM HG: CPT | Mod: CPTII,S$GLB,, | Performed by: INTERNAL MEDICINE

## 2025-07-23 PROCEDURE — 3008F BODY MASS INDEX DOCD: CPT | Mod: CPTII,S$GLB,, | Performed by: INTERNAL MEDICINE

## 2025-07-23 NOTE — PROGRESS NOTES
[facility]    Subjective:     Patient ID:  Yolanda Anthony is a 50 y.o. female patient here for evaluation Results (Stress test )      History of Present Illness    CHIEF COMPLAINT:  Patient presents today for follow up of chest discomfort    CARDIAC TESTING:  She recently completed a walking stress test, achieving 8 minutes 42 seconds on Andrés protocol, which was negative for stenosis. Calcium scoring showed minimal calcium deposition, making significant cardiac disease unlikely.    HYPERLIPIDEMIA:  LDL cholesterol is 150 mg/dL, currently elevated. She takes red yeast rice supplement for cholesterol management and has declined additional cholesterol medication, noting ongoing monitoring by her primary care physician.    MEDICATIONS:  She is currently taking beta blockers.    FAMILY HISTORY:  She reports a family history of cardiac disease.    SOCIAL HISTORY:  She reports occasional, moderate alcohol consumption and denies illicit drug use.      ROS:  ROS is negative unless otherwise indicated in HPI.           ROS     No past medical history on file.    Past Surgical History:   Procedure Laterality Date    KNEE SURGERY Bilateral        No family history on file.    Social History     Socioeconomic History    Marital status:    Tobacco Use    Smoking status: Never    Smokeless tobacco: Never   Substance and Sexual Activity    Alcohol use: Not Currently    Drug use: Never       Current Medications[1]    Review of patient's allergies indicates:   Allergen Reactions    Insect venom Anaphylaxis    Wasp venom Anaphylaxis    Venom-honey bee Nausea And Vomiting         Objective:        Vitals:    07/23/25 0836   BP: 118/70   Pulse: 71       Physical Exam  Vitals reviewed.   Constitutional:       Appearance: Normal appearance.   Cardiovascular:      Rate and Rhythm: Normal rate and regular rhythm.      Pulses: Normal pulses.      Heart sounds: Normal  "heart sounds. No murmur heard.     No gallop.   Pulmonary:      Effort: Pulmonary effort is normal.   Skin:     General: Skin is warm.   Neurological:      General: No focal deficit present.      Mental Status: She is alert and oriented to person, place, and time.         LIPIDS - LAST 2   No results found for: "CHOL", "HDL", "LDLCALC", "TRIG", "CHOLHDL"    CBC - LAST 2  No results found for: "WBC", "RBC", "HGB", "HCT", "MCV", "MCH", "MCHC", "RDW", "PLT", "MPV", "GRAN", "LYMPH", "MONO", "BASO", "NRBC"    CHEMISTRY & LIVER FUNCTION - LAST 2  No results found for: "NA", "K", "CL", "CO2", "ANIONGAP", "BUN", "CREATININE", "GLU", "CALCIUM", "PH", "MG", "ALBUMIN", "PROT", "ALKPHOS", "ALT", "AST", "BILITOT"     CARDIAC PROFILE - LAST 2  No results found for: "BNP", "CPK", "CPKMB", "LDH", "TROPONINI"     COAGULATION - LAST 2  No results found for: "LABPT", "INR", "APTT"    ENDOCRINE & PSA - LAST 2  No results found for: "HGBA1C", "MICROALBUR", "TSH", "PROCAL", "PSA"     ECHOCARDIOGRAM RESULTS  No results found for this or any previous visit.      CURRENT/PREVIOUS VISIT EKG  Results for orders placed or performed in visit on 07/01/25   IN OFFICE EKG 12-LEAD (to [place])    Collection Time: 07/01/25  9:48 AM   Result Value Ref Range    QRS Duration 88 ms    [facility] QTC Calculation 429 ms    Narrative    Test Reason : Z82.49,R07.89,    Vent. Rate :  70 BPM     Atrial Rate :  70 BPM     P-R Int : 144 ms          QRS Dur :  88 ms      QT Int : 398 ms       P-R-T Axes :  31  53  36 degrees    QTcB Int : 429 ms    Sinus rhythm with Premature atrial complexes  Cannot rule out Anterior infarct ,age undetermined  Abnormal ECG  No previous ECGs available    Referred By: MARYBETH LOYD           Confirmed By:      No valid procedures specified.   Results for orders placed during the hospital encounter of 07/07/25    Exercise Stress - EKG    Interpretation Summary    The ECG portion of the study is negative for ischemia.    The patient " reported no chest pain during the stress test.    The blood pressure response to stress was normal.    During stress, occasional PVCs are noted.    The patient exercised for 8 minutes 42 seconds on a Andrés protocol, achieving a peak heart rate of 145 bpm, which is 90% of the age predicted maximum heart rate.    No valid procedures specified.        Assessment:        Assessment & Plan        Patient here for follow-up of atypical chest pain.  Stress test negative for ischemia.  CT calcium score shows minimal calcium deposition which is reassuring from a cardiac standpoint.  Continue with primary prevention.    PLAN SUMMARY:  - Engage in regular physical exercise: 5 days/week, 30 minutes/day  - Follow Mediterranean diet: focus on leafy greens and non-fried seafood, avoid fried/fatty foods and red meat  - Consider weight loss  - Limit alcohol consumption  - Lifestyle modifications recommended for LDL cholesterol level of 150  - Low-dose cholesterol medication offered if patient desires  - Follow up with primary care physician for cholesterol management    HYPERLIPIDEMIA:  - LDL cholesterol level of 150 warrants lifestyle modifications.  - Offered to prescribe a low-dose cholesterol medication if patient wishes to try it.  - Explained Mediterranean diet principles for heart health.  - Patient to engage in regular physical exercise 5 days a week, 30 minutes per day, follow Mediterranean diet: focus on green leafy vegetables and non-fried seafood, avoid fried foods, fatty foods, and red meat products, consider weight loss, limit alcohol consumption.  - Follow up with primary care physician for cholesterol management.    ISCHEMIC HEART DISEASE PREVENTION:  - Negative stress test results: 8 minutes 42 seconds on Andrés protocol.  - Very little calcium deposition on calcium scoring, making significant heart disease unlikely.  - Discussed importance of regular physical activity for cardiovascular health.  - Advised on signs  of potential cardiac issues to monitor (e.g., shortness of breath or chest tightness with exercise).  - Informed patient about lifestyle factors that could negatively impact heart health (smoking, uncontrolled diabetes, drug use, excessive alcohol consumption).  - Contact the office if experiencing shortness of breath or chest pain/tightness with exercise.        1. Chest pain, unspecified type           Plan:       Chest pain, unspecified type      Follow up if symptoms worsen or fail to improve.      All pertinent data including labs, imaging, EKGs, and studies listed above were reviewed personally.  Patient's most recent EKG tracing was personally interpreted by this provider.    This note was generated with the assistance of ambient listening technology. Verbal consent was obtained by the patient and accompanying visitor(s) for the recording of patient appointment to facilitate this note. I attest to having reviewed and edited the generated note for accuracy, though some syntax or spelling errors may persist. Please contact the author of this note for any clarification.      Isael Hernandez MD  [facility] [place] [facility]         [1]   Current Outpatient Medications   Medication Sig Dispense Refill    albuterol (VENTOLIN HFA) 90 mcg/actuation inhaler Inhale 1-2 puffs into the lungs every 6 (six) hours as needed for Shortness of Breath or Wheezing. Rescue 18 g 0    azelastine-fluticasone (DYMISTA) 137-50 mcg/spray Spry nassal spray 1 spray by Each Nostril route 2 (two) times daily. 23 g 0    busPIRone (BUSPAR) 15 MG tablet Take 15 mg by mouth 3 (three) times daily.      cetirizine (ZYRTEC) 10 MG tablet Take 10 mg by mouth.      cetirizine-pseudoephedrine 5-120 mg Tb12 Take 1 tablet by mouth every 12 (twelve) hours as needed (nasal congestion). 18 tablet 0    EPINEPHrine 0.15 mg/0.15 mL AtIn Inject 0.15 mg into the muscle.      famotidine (PEPCID) 40 MG tablet Take 40 mg  by mouth.      levocetirizine (XYZAL) 5 MG tablet Take 1 tablet (5 mg total) by mouth every evening. 30 tablet 0    metoprolol succinate (TOPROL-XL) 25 MG 24 hr tablet Take 1 tablet (25 mg total) by mouth once daily. 90 tablet 3    omeprazole (PRILOSEC) 20 MG capsule Take 20 mg by mouth 3 (three) times daily.      pantoprazole (PROTONIX) 40 MG tablet Take 1 tablet (40 mg total) by mouth once daily. 90 tablet 3    RED YEAST RICE ORAL Take by mouth Daily.      venlafaxine 150 mg TR24 Take 1 tablet by mouth every morning.      venlafaxine (EFFEXOR) 75 MG tablet Take 75 mg by mouth. (Patient not taking: Reported on 7/1/2025)       No current facility-administered medications for this visit.

## 2025-08-16 ENCOUNTER — OFFICE VISIT (OUTPATIENT)
Dept: URGENT CARE | Facility: CLINIC | Age: 50
End: 2025-08-16
Payer: COMMERCIAL

## 2025-08-16 VITALS
SYSTOLIC BLOOD PRESSURE: 131 MMHG | HEART RATE: 66 BPM | WEIGHT: 167 LBS | TEMPERATURE: 98 F | OXYGEN SATURATION: 98 % | RESPIRATION RATE: 16 BRPM | DIASTOLIC BLOOD PRESSURE: 86 MMHG | BODY MASS INDEX: 31.53 KG/M2 | HEIGHT: 61 IN

## 2025-08-16 DIAGNOSIS — U07.1 POSITIVE SELF-ADMINISTERED ANTIGEN TEST FOR COVID-19: ICD-10-CM

## 2025-08-16 DIAGNOSIS — R53.83 FATIGUE, UNSPECIFIED TYPE: Primary | ICD-10-CM

## 2025-08-16 PROCEDURE — 99213 OFFICE O/P EST LOW 20 MIN: CPT | Mod: S$GLB,,, | Performed by: STUDENT IN AN ORGANIZED HEALTH CARE EDUCATION/TRAINING PROGRAM

## 2025-08-16 RX ORDER — NIRMATRELVIR AND RITONAVIR 300-100 MG
KIT ORAL
Qty: 30 TABLET | Refills: 0 | Status: SHIPPED | OUTPATIENT
Start: 2025-08-16 | End: 2025-08-21

## 2025-08-16 RX ORDER — PROMETHAZINE HYDROCHLORIDE AND DEXTROMETHORPHAN HYDROBROMIDE 6.25; 15 MG/5ML; MG/5ML
5 SYRUP ORAL
Qty: 118 ML | Refills: 0 | Status: SHIPPED | OUTPATIENT
Start: 2025-08-16 | End: 2025-08-26